# Patient Record
Sex: MALE | Race: WHITE | NOT HISPANIC OR LATINO
[De-identification: names, ages, dates, MRNs, and addresses within clinical notes are randomized per-mention and may not be internally consistent; named-entity substitution may affect disease eponyms.]

---

## 2022-08-01 ENCOUNTER — APPOINTMENT (OUTPATIENT)
Dept: RHEUMATOLOGY | Facility: CLINIC | Age: 72
End: 2022-08-01

## 2022-08-01 VITALS
HEART RATE: 53 BPM | HEIGHT: 68 IN | TEMPERATURE: 98.1 F | SYSTOLIC BLOOD PRESSURE: 181 MMHG | DIASTOLIC BLOOD PRESSURE: 68 MMHG | OXYGEN SATURATION: 98 % | WEIGHT: 213 LBS | BODY MASS INDEX: 32.28 KG/M2

## 2022-08-01 DIAGNOSIS — I65.29 OCCLUSION AND STENOSIS OF UNSPECIFIED CAROTID ARTERY: ICD-10-CM

## 2022-08-01 DIAGNOSIS — Z87.891 PERSONAL HISTORY OF NICOTINE DEPENDENCE: ICD-10-CM

## 2022-08-01 DIAGNOSIS — I73.9 PERIPHERAL VASCULAR DISEASE, UNSPECIFIED: ICD-10-CM

## 2022-08-01 PROBLEM — Z00.00 ENCOUNTER FOR PREVENTIVE HEALTH EXAMINATION: Status: ACTIVE | Noted: 2022-08-01

## 2022-08-01 PROCEDURE — 99204 OFFICE O/P NEW MOD 45 MIN: CPT

## 2022-08-01 NOTE — HISTORY OF PRESENT ILLNESS
[FreeTextEntry1] : Referring physician: Dr. Tu Green\par \par 70 y/o M here for consultation.\par \par Pt reports that medication has been increasing his uric acid. Daughter reports his uric acid is 11. \par \par Pt daughter reports that he has CP. \par \par Pt reports gout attacks since past year. First attack was in his first toe. Pt was given steroids and colchicine. Pt also has been on allopurinol 100mg daily. Pt was told to increase it to 200mg daily. \par \par Pt says now he has flares in elbows. \par \par No fevers, h/a, rashes, hair loss, oral ulcers, epistaxis, sinusitis,  swollen glands, dry mouth, dry eyes, CP, SOB, cough, vision changes, abdominal pain, GERD, n/v/d, blood in stool or urine, focal weakness, sensory loss,  Raynaud's, joint pain, swelling, weight loss. \par CP discomfort w arrhythmias. \par \par No sodas. No significant red meat, shellfish.

## 2022-08-01 NOTE — ASSESSMENT
[FreeTextEntry1] : 70 y/o M w uncontrolled gout\par =episodes of acute arthritis\par =>3 attacks per year \par =podagra: 1st MTP attack\par =hyperuremia\par =obese, on diuretics \par \par Pt w likely gout given clinical presentation. Talked about consequences of untreated gout, including joint erosions, deformities, tophi, and increase risk of CVD, renal dz. Talked to patient that goal of uric acid Thera-P is to bring uric acid less than 6. While uric acid goes to this level, pt must be on ppx, as he is at risk of more flares while uric acid goes down. \par \par Counseled on gout diet. Instructed to eat diet low in red meat, shellfish, alcohol, dark sodas. Encouraged low fat dairy, healthy weight. \par \par Talk to pt about tx of gout. There are two components of tx. Acute attacks are generally tx w NSAIDs, steroids, or colchicine. There are certain indications for uric acid lower therapy (ULT): 1) >3 attacks per year 2) CKD 3) renal stones 4) tophi. ULT can be allopurinol, febuxostat or less commonly probenecid. \par \par Counseled that allopurinol used is still very low dose, and it can be maximized to 800mg daily. It has to be uptitrated slowly. If labs ok, will raise allopurinol to 300mg daily. Pt also needs to be ppx to prevent gout flares as uric acid goes to targe, which is 6 or sometimes 5. Will place pt on standing colchicine. Counseled patient on side effects of colchicine. Most common side effect is GI upset, loose stool to diarrhea. Less common side effects are neuropathy, myopathy, cytopenias. \par \par Instructed to continue allopurinol and colchicine during gout flares. If there are breakthrough flares, can rx steroid taper. \par \par Need baseline labs first however, before changing medication. \par \par Plan\par -Labs w serologies, inflammatory markers\par Will call this week regarding labs and proceedment with plan

## 2022-08-01 NOTE — PHYSICAL EXAM
[General Appearance - Well Nourished] : well nourished [General Appearance - Well Developed] : well developed [Sclera] : the sclera and conjunctiva were normal [Auscultation Breath Sounds / Voice Sounds] : lungs were clear to auscultation bilaterally [Heart Sounds Gallop] : no gallops [Heart Sounds] : normal S1 and S2 [Murmurs] : no murmurs [Heart Sounds Pericardial Friction Rub] : no pericardial rub [Abdomen Soft] : soft [FreeTextEntry1] : L olecranon bursal effusion  [] : no rash [Skin Lesions] : no skin lesions [Oriented To Time, Place, And Person] : oriented to person, place, and time

## 2022-08-01 NOTE — CONSULT LETTER
[Dear  ___] : Dear  [unfilled], [Consult Letter:] : I had the pleasure of evaluating your patient, [unfilled]. [Consult Closing:] : Thank you very much for allowing me to participate in the care of this patient.  If you have any questions, please do not hesitate to contact me. [Sincerely,] : Sincerely, [FreeTextEntry2] : Dr. Tu Green \par [address] [facility] [address], \[place] [address] [FreeTextEntry3] : Artie Bellamy MD

## 2022-08-02 LAB
ALBUMIN SERPL ELPH-MCNC: 4.8 G/DL
ALP BLD-CCNC: 74 U/L
ALT SERPL-CCNC: 20 U/L
ANION GAP SERPL CALC-SCNC: 14 MMOL/L
AST SERPL-CCNC: 21 U/L
BASOPHILS # BLD AUTO: 0.04 K/UL
BASOPHILS NFR BLD AUTO: 0.6 %
BILIRUB SERPL-MCNC: 0.3 MG/DL
BUN SERPL-MCNC: 41 MG/DL
CALCIUM SERPL-MCNC: 10.5 MG/DL
CHLORIDE SERPL-SCNC: 100 MMOL/L
CO2 SERPL-SCNC: 27 MMOL/L
CREAT SERPL-MCNC: 1.66 MG/DL
CRP SERPL-MCNC: <3 MG/L
EGFR: 44 ML/MIN/1.73M2
EOSINOPHIL # BLD AUTO: 0.14 K/UL
EOSINOPHIL NFR BLD AUTO: 2.1 %
ERYTHROCYTE [SEDIMENTATION RATE] IN BLOOD BY WESTERGREN METHOD: 47 MM/HR
GLUCOSE SERPL-MCNC: 144 MG/DL
HCT VFR BLD CALC: 39.8 %
HGB BLD-MCNC: 12.5 G/DL
IMM GRANULOCYTES NFR BLD AUTO: 0.4 %
LYMPHOCYTES # BLD AUTO: 1.85 K/UL
LYMPHOCYTES NFR BLD AUTO: 27.6 %
MAN DIFF?: NORMAL
MCHC RBC-ENTMCNC: 31.4 GM/DL
MCHC RBC-ENTMCNC: 31.6 PG
MCV RBC AUTO: 100.5 FL
MONOCYTES # BLD AUTO: 0.52 K/UL
MONOCYTES NFR BLD AUTO: 7.8 %
NEUTROPHILS # BLD AUTO: 4.12 K/UL
NEUTROPHILS NFR BLD AUTO: 61.5 %
PLATELET # BLD AUTO: 196 K/UL
POTASSIUM SERPL-SCNC: 4.7 MMOL/L
PROT SERPL-MCNC: 7.4 G/DL
RBC # BLD: 3.96 M/UL
RBC # FLD: 13.2 %
SODIUM SERPL-SCNC: 141 MMOL/L
URATE SERPL-MCNC: 7.7 MG/DL
WBC # FLD AUTO: 6.7 K/UL

## 2022-08-02 RX ORDER — COLCHICINE 0.6 MG/1
0.6 TABLET ORAL
Qty: 90 | Refills: 0 | Status: ACTIVE | COMMUNITY
Start: 2022-08-02 | End: 1900-01-01

## 2022-08-02 RX ORDER — ALLOPURINOL 300 MG/1
300 TABLET ORAL DAILY
Qty: 90 | Refills: 1 | Status: ACTIVE | COMMUNITY
Start: 2022-08-02 | End: 1900-01-01

## 2022-09-06 ENCOUNTER — APPOINTMENT (OUTPATIENT)
Dept: RHEUMATOLOGY | Facility: CLINIC | Age: 72
End: 2022-09-06

## 2022-09-06 DIAGNOSIS — M1A.9XX0 CHRONIC GOUT, UNSPECIFIED, W/OUT TOPHUS (TOPHI): ICD-10-CM

## 2022-09-06 DIAGNOSIS — Z79.899 OTHER LONG TERM (CURRENT) DRUG THERAPY: ICD-10-CM

## 2022-09-06 PROCEDURE — 99442: CPT | Mod: 95

## 2023-04-03 RX ORDER — SODIUM CHLORIDE 9 MG/ML
1000 INJECTION, SOLUTION INTRAVENOUS
Refills: 0 | Status: DISCONTINUED | OUTPATIENT
Start: 2023-04-05 | End: 2023-04-05

## 2023-04-04 NOTE — ASU PATIENT PROFILE, ADULT - NSICDXPASTSURGICALHX_GEN_ALL_CORE_FT
PAST SURGICAL HISTORY:  History of left common carotid artery stent placement     Presence of stent in artery both legs    S/P cataract surgery bilateral

## 2023-04-04 NOTE — ASU PATIENT PROFILE, ADULT - NS PREOP UNDERSTANDS INFO
no solids after 12mn, clears allowed up to 4am, bring ID and insurance card, no valuables or jewelry, wear comfortable clothing/yes

## 2023-04-04 NOTE — ASU PATIENT PROFILE, ADULT - FALL HARM RISK - UNIVERSAL INTERVENTIONS
Bed in lowest position, wheels locked, appropriate side rails in place/Call bell, personal items and telephone in reach/Instruct patient to call for assistance before getting out of bed or chair/Non-slip footwear when patient is out of bed/[place] to call system/Physically safe environment - no spills, clutter or unnecessary equipment/Purposeful Proactive Rounding/Room/bathroom lighting operational, light cord in reach

## 2023-04-04 NOTE — ASU PATIENT PROFILE, ADULT - NSICDXPASTMEDICALHX_GEN_ALL_CORE_FT
PAST MEDICAL HISTORY:  CAD (coronary artery disease)     Dementia early    Gout     HTN (hypertension)

## 2023-04-04 NOTE — ASU PATIENT PROFILE, ADULT - INTERPRETATION SERVICES DECLINED
Patient/Caregiver requests family/friend to interpret. daughter Josie/Patient/Caregiver requests family/friend to interpret.

## 2023-04-05 ENCOUNTER — TRANSCRIPTION ENCOUNTER (OUTPATIENT)
Age: 73
End: 2023-04-05

## 2023-04-05 ENCOUNTER — OUTPATIENT (OUTPATIENT)
Dept: OUTPATIENT SERVICES | Facility: HOSPITAL | Age: 73
LOS: 1 days | Discharge: HOME | End: 2023-04-05

## 2023-04-05 VITALS
RESPIRATION RATE: 16 BRPM | TEMPERATURE: 99 F | OXYGEN SATURATION: 98 % | WEIGHT: 192.02 LBS | DIASTOLIC BLOOD PRESSURE: 50 MMHG | SYSTOLIC BLOOD PRESSURE: 164 MMHG | HEART RATE: 51 BPM | HEIGHT: 68 IN

## 2023-04-05 VITALS
OXYGEN SATURATION: 98 % | HEART RATE: 50 BPM | RESPIRATION RATE: 14 BRPM | TEMPERATURE: 97 F | DIASTOLIC BLOOD PRESSURE: 54 MMHG | SYSTOLIC BLOOD PRESSURE: 150 MMHG

## 2023-04-05 DIAGNOSIS — Z98.890 OTHER SPECIFIED POSTPROCEDURAL STATES: Chronic | ICD-10-CM

## 2023-04-05 DIAGNOSIS — Z95.828 PRESENCE OF OTHER VASCULAR IMPLANTS AND GRAFTS: Chronic | ICD-10-CM

## 2023-04-05 DIAGNOSIS — Z98.49 CATARACT EXTRACTION STATUS, UNSPECIFIED EYE: Chronic | ICD-10-CM

## 2023-04-05 DEVICE — GAS IOL HEXAFLUORIDE CYL
Type: IMPLANTABLE DEVICE | Site: LEFT | Status: NON-FUNCTIONAL
Removed: 2023-04-05

## 2023-04-05 RX ORDER — ONDANSETRON 8 MG/1
4 TABLET, FILM COATED ORAL ONCE
Refills: 0 | Status: DISCONTINUED | OUTPATIENT
Start: 2023-04-05 | End: 2023-04-05

## 2023-04-05 RX ORDER — OFLOXACIN 0.3 %
1 DROPS OPHTHALMIC (EYE)
Refills: 0 | Status: COMPLETED | OUTPATIENT
Start: 2023-04-05 | End: 2023-04-05

## 2023-04-05 RX ORDER — PHENYLEPHRINE HCL 2.5 %
1 DROPS OPHTHALMIC (EYE)
Refills: 0 | Status: COMPLETED | OUTPATIENT
Start: 2023-04-05 | End: 2023-04-05

## 2023-04-05 RX ORDER — ACETAMINOPHEN 500 MG
1000 TABLET ORAL ONCE
Refills: 0 | Status: DISCONTINUED | OUTPATIENT
Start: 2023-04-05 | End: 2023-04-05

## 2023-04-05 RX ORDER — TROPICAMIDE 1 %
1 DROPS OPHTHALMIC (EYE)
Refills: 0 | Status: COMPLETED | OUTPATIENT
Start: 2023-04-05 | End: 2023-04-05

## 2023-04-05 RX ORDER — ATROPINE SULFATE 1 %
1 DROPS OPHTHALMIC (EYE)
Refills: 0 | Status: COMPLETED | OUTPATIENT
Start: 2023-04-05 | End: 2023-04-05

## 2023-04-05 RX ADMIN — Medication 1 DROP(S): at 07:01

## 2023-04-05 RX ADMIN — Medication 1 DROP(S): at 07:03

## 2023-04-05 RX ADMIN — Medication 1 DROP(S): at 06:52

## 2023-04-05 RX ADMIN — Medication 1 DROP(S): at 06:53

## 2023-04-05 RX ADMIN — Medication 1 DROP(S): at 07:00

## 2023-04-05 RX ADMIN — Medication 1 DROP(S): at 06:54

## 2023-04-05 RX ADMIN — Medication 1 DROP(S): at 07:04

## 2023-04-05 NOTE — PROVIDER CONTACT NOTE (OTHER) - ACTION/TREATMENT ORDERED:
MD Falcon okayed pt to go home
PT seen by MD Falcon. Decision made to keep pt for observation in phase 2. Will update MD Falcon with further vitals

## 2023-04-05 NOTE — ASU DISCHARGE PLAN (ADULT/PEDIATRIC) - NS MD DC FALL RISK RISK
For information on Fall & Injury Prevention, visit: [email or web address] OR  [email or web address] OR  https://www.cdc.gov/steadi/patient.html

## 2023-04-05 NOTE — PRE-ANESTHESIA EVALUATION ADULT - NSANTHOSAYNRD_GEN_A_CORE
No. GERI screening performed.  STOP BANG Legend: 0-2 = LOW Risk; 3-4 = INTERMEDIATE Risk; 5-8 = HIGH Risk

## 2023-04-05 NOTE — OPERATIVE REPORT - OPERATIVE RPOSRT DETAILS
Date of Procedure:    Surgeon:  Len Sosa MD    Pre-operative diagnosis: Macular hole OS    Post-operative diagnosis:Macular hole OS        Procedure: Vitrectomy, membranectomy, fluid gas exchange    Complications: none    Estimated blood loss: 0    Anesthesia:MAC    The patient was brought into the operating room and given sedation by the anesthesiologist. The patient was given a retrobulbar injection composed of 4 cc lidocaine 2% and 1 cc marcaine 0.75%. Three 23 gauge trochars were inserted into the eye. A vitectomy was performed using a wide field viewing system.    The internal limiting membrane was removed with brilliant blue and a membrane forceps. A fluid gas exchange was then performed.    At the end off the procedure the trochars were removed. The eye was patched and a shield was placed over the eye. The patient left the OR in good condition.

## 2023-04-05 NOTE — ASU PREOP CHECKLIST - SPO2 (%)
[No Acute Distress] : no acute distress [Well Nourished] : well nourished [Well Developed] : well developed [Well-Appearing] : well-appearing [Normal Voice/Communication] : normal voice/communication [Normal Sclera/Conjunctiva] : normal sclera/conjunctiva [Normal Outer Ear/Nose] : the outer ears and nose were normal in appearance [No JVD] : no jugular venous distention [No Respiratory Distress] : no respiratory distress  [No Accessory Muscle Use] : no accessory muscle use [Clear to Auscultation] : lungs were clear to auscultation bilaterally [Normal Rate] : normal rate  [Normal S1, S2] : normal S1 and S2 [Regular Rhythm] : with a regular rhythm [No Murmur] : no murmur heard [No Edema] : there was no peripheral edema [Soft] : abdomen soft [Non Tender] : non-tender [Non-distended] : non-distended [No Masses] : no abdominal mass palpated [No HSM] : no HSM [Normal Bowel Sounds] : normal bowel sounds 98 [No Spinal Tenderness] : no spinal tenderness [No Joint Swelling] : no joint swelling [Grossly Normal Strength/Tone] : grossly normal strength/tone [No Rash] : no rash [Coordination Grossly Intact] : coordination grossly intact [No Focal Deficits] : no focal deficits [Normal Gait] : normal gait [Speech Grossly Normal] : speech grossly normal [Normal Affect] : the affect was normal [Alert and Oriented x3] : oriented to person, place, and time [Normal Mood] : the mood was normal [Normal Insight/Judgement] : insight and judgment were intact

## 2023-04-05 NOTE — PACU DISCHARGE NOTE - COMMENTS
Called to evaluate patient with HR in the 30's in Phase 2.  The patient has a preop HR of 50 and 40's range.  The patient was able to ambulate to the bathroom without symptoms and was asymptommatic.  I asked the daughter to allow us to observe the patient until he returned to his baseline (she refused medication, I wanted to administer robinul).  I agreed to hold medication until he returned to baseline.  He is acceptable for discharge with a heart rate of 50.

## 2023-04-05 NOTE — PROVIDER CONTACT NOTE (OTHER) - SITUATION
MD glaser aware that pts HR was 50, ambulated without dizziness or lightheadedness
Pt HR 39-40, /55 T 36.1 SpO2 97%

## 2023-04-13 PROBLEM — I73.9 PERIPHERAL VASCULAR DISEASE, UNSPECIFIED: Chronic | Status: ACTIVE | Noted: 2023-04-05

## 2023-04-13 PROBLEM — Z98.890 OTHER SPECIFIED POSTPROCEDURAL STATES: Chronic | Status: ACTIVE | Noted: 2023-04-05

## 2023-04-18 RX ORDER — CLOPIDOGREL BISULFATE 75 MG/1
1 TABLET, FILM COATED ORAL
Refills: 0 | DISCHARGE

## 2023-04-18 NOTE — ASU PATIENT PROFILE, ADULT - NSICDXPASTMEDICALHX_GEN_ALL_CORE_FT
PAST MEDICAL HISTORY:  CAD (coronary artery disease)     Dementia early    Gout     History of right common carotid artery stent placement     HTN (hypertension)     PAD (peripheral artery disease)

## 2023-04-18 NOTE — ASU PATIENT PROFILE, ADULT - NSICDXPASTSURGICALHX_GEN_ALL_CORE_FT
PAST SURGICAL HISTORY:  Elective surgery Left Macular Hole Repair    History of left common carotid artery stent placement     Presence of stent in artery both legs    S/P cataract surgery bilateral

## 2023-04-18 NOTE — ASU PATIENT PROFILE, ADULT - VISION (WITH CORRECTIVE LENSES IF THE PATIENT USUALLY WEARS THEM):
bilateral cataract surgery/Normal vision: sees adequately in most situations; can see medication labels, newsprint

## 2023-04-18 NOTE — ASU PATIENT PROFILE, ADULT - PATIENT REPRESENTATIVE NAME
Same as Above Ketoconazole Pregnancy And Lactation Text: This medication is Pregnancy Category C and it isn't know if it is safe during pregnancy. It is also excreted in breast milk and breast feeding isn't recommended.

## 2023-04-19 ENCOUNTER — OUTPATIENT (OUTPATIENT)
Dept: OUTPATIENT SERVICES | Facility: HOSPITAL | Age: 73
LOS: 1 days | Discharge: HOME | End: 2023-04-19

## 2023-04-19 ENCOUNTER — TRANSCRIPTION ENCOUNTER (OUTPATIENT)
Age: 73
End: 2023-04-19

## 2023-04-19 VITALS
DIASTOLIC BLOOD PRESSURE: 47 MMHG | OXYGEN SATURATION: 96 % | HEART RATE: 40 BPM | TEMPERATURE: 99 F | RESPIRATION RATE: 16 BRPM | SYSTOLIC BLOOD PRESSURE: 155 MMHG

## 2023-04-19 VITALS
WEIGHT: 193.57 LBS | HEART RATE: 48 BPM | TEMPERATURE: 98 F | DIASTOLIC BLOOD PRESSURE: 50 MMHG | SYSTOLIC BLOOD PRESSURE: 177 MMHG | HEIGHT: 68 IN | OXYGEN SATURATION: 98 % | RESPIRATION RATE: 16 BRPM

## 2023-04-19 DIAGNOSIS — Z95.828 PRESENCE OF OTHER VASCULAR IMPLANTS AND GRAFTS: Chronic | ICD-10-CM

## 2023-04-19 DIAGNOSIS — Z98.890 OTHER SPECIFIED POSTPROCEDURAL STATES: Chronic | ICD-10-CM

## 2023-04-19 DIAGNOSIS — Z98.49 CATARACT EXTRACTION STATUS, UNSPECIFIED EYE: Chronic | ICD-10-CM

## 2023-04-19 DIAGNOSIS — Z41.9 ENCOUNTER FOR PROCEDURE FOR PURPOSES OTHER THAN REMEDYING HEALTH STATE, UNSPECIFIED: Chronic | ICD-10-CM

## 2023-04-19 PROBLEM — F03.90 UNSPECIFIED DEMENTIA, UNSPECIFIED SEVERITY, WITHOUT BEHAVIORAL DISTURBANCE, PSYCHOTIC DISTURBANCE, MOOD DISTURBANCE, AND ANXIETY: Chronic | Status: ACTIVE | Noted: 2023-04-04

## 2023-04-19 PROBLEM — M10.9 GOUT, UNSPECIFIED: Chronic | Status: ACTIVE | Noted: 2023-04-04

## 2023-04-19 DEVICE — GAS IOL HEXAFLUORIDE CYL
Type: IMPLANTABLE DEVICE | Status: NON-FUNCTIONAL
Removed: 2023-04-19

## 2023-04-19 RX ORDER — ATROPINE SULFATE 1 %
1 DROPS OPHTHALMIC (EYE)
Refills: 0 | Status: COMPLETED | OUTPATIENT
Start: 2023-04-19 | End: 2023-04-19

## 2023-04-19 RX ORDER — ONDANSETRON 8 MG/1
4 TABLET, FILM COATED ORAL ONCE
Refills: 0 | Status: DISCONTINUED | OUTPATIENT
Start: 2023-04-19 | End: 2023-04-19

## 2023-04-19 RX ORDER — SODIUM CHLORIDE 9 MG/ML
1000 INJECTION, SOLUTION INTRAVENOUS
Refills: 0 | Status: DISCONTINUED | OUTPATIENT
Start: 2023-04-19 | End: 2023-04-19

## 2023-04-19 RX ORDER — HYDRALAZINE HCL 50 MG
1 TABLET ORAL
Refills: 0 | DISCHARGE

## 2023-04-19 RX ORDER — TROPICAMIDE 1 %
1 DROPS OPHTHALMIC (EYE)
Refills: 0 | Status: COMPLETED | OUTPATIENT
Start: 2023-04-19 | End: 2023-04-19

## 2023-04-19 RX ORDER — CLOPIDOGREL BISULFATE 75 MG/1
0 TABLET, FILM COATED ORAL
Refills: 0 | DISCHARGE

## 2023-04-19 RX ORDER — PHENYLEPHRINE HCL 2.5 %
1 DROPS OPHTHALMIC (EYE)
Refills: 0 | Status: COMPLETED | OUTPATIENT
Start: 2023-04-19 | End: 2023-04-19

## 2023-04-19 RX ORDER — NEBIVOLOL HYDROCHLORIDE 5 MG/1
1 TABLET ORAL
Refills: 0 | DISCHARGE

## 2023-04-19 RX ORDER — OFLOXACIN 0.3 %
1 DROPS OPHTHALMIC (EYE)
Refills: 0 | Status: COMPLETED | OUTPATIENT
Start: 2023-04-19 | End: 2023-04-19

## 2023-04-19 RX ORDER — ACETAMINOPHEN 500 MG
650 TABLET ORAL ONCE
Refills: 0 | Status: DISCONTINUED | OUTPATIENT
Start: 2023-04-19 | End: 2023-04-19

## 2023-04-19 RX ORDER — AMLODIPINE BESYLATE 2.5 MG/1
1 TABLET ORAL
Refills: 0 | DISCHARGE

## 2023-04-19 RX ORDER — ALLOPURINOL 300 MG
1 TABLET ORAL
Refills: 0 | DISCHARGE

## 2023-04-19 RX ADMIN — Medication 1 DROP(S): at 07:10

## 2023-04-19 RX ADMIN — Medication 1 DROP(S): at 07:15

## 2023-04-19 RX ADMIN — Medication 1 DROP(S): at 07:20

## 2023-04-19 NOTE — OPERATIVE REPORT - OPERATIVE RPOSRT DETAILS
Date of Procedure:    Surgeon:  Len Sosa MD    Pre-operative diagnosis: Macular hole OD    Post-operative diagnosis:Macular hole OD        Procedure: Vitrectomy, membranectomy, fluid gas exchange    Complications: none    Estimated blood loss: 0    Anesthesia:MAC    The patient was brought into the operating room and given sedation by the anesthesiologist. The patient was given a retrobulbar injection composed of 4 cc lidocaine 2% and 1 cc marcaine 0.75%. Three 23 gauge trochars were inserted into the eye. A vitectomy was performed using a wide field viewing system.    The epiretinal membrane and internal limiting membrane were removed after Brilliant Blue staining. A fluid gas exchange was then performed.    At the end off the procedure the trochars were removed. The eye was patched and a shield was placed over the eye. The patient left the OR in good condition.

## 2023-04-19 NOTE — ASU PREOP CHECKLIST - 2.
patient speaks Khmer and Sammarinese with limited English. Declined official , prefer daughter Michelle Hazel as .

## (undated) DEVICE — POST VITRECTOMY 23GA W V AFI WF

## (undated) DEVICE — APPLICATOR COTTON TIP 6"

## (undated) DEVICE — CANNULA ALCON SOFT TIP 23G

## (undated) DEVICE — LENSE FLAT

## (undated) DEVICE — GLV 8 PROTEXIS (WHITE)